# Patient Record
Sex: FEMALE | Race: WHITE | NOT HISPANIC OR LATINO
[De-identification: names, ages, dates, MRNs, and addresses within clinical notes are randomized per-mention and may not be internally consistent; named-entity substitution may affect disease eponyms.]

---

## 2021-06-29 ENCOUNTER — APPOINTMENT (OUTPATIENT)
Dept: OTOLARYNGOLOGY | Facility: CLINIC | Age: 76
End: 2021-06-29
Payer: MEDICARE

## 2021-06-29 VITALS — TEMPERATURE: 98.6 F | BODY MASS INDEX: 19.63 KG/M2 | HEIGHT: 64 IN | WEIGHT: 115 LBS

## 2021-06-29 DIAGNOSIS — Z80.6 FAMILY HISTORY OF LEUKEMIA: ICD-10-CM

## 2021-06-29 DIAGNOSIS — H93.299 OTHER ABNORMAL AUDITORY PERCEPTIONS, UNSPECIFIED EAR: ICD-10-CM

## 2021-06-29 DIAGNOSIS — Z78.9 OTHER SPECIFIED HEALTH STATUS: ICD-10-CM

## 2021-06-29 DIAGNOSIS — Z85.828 PERSONAL HISTORY OF OTHER MALIGNANT NEOPLASM OF SKIN: ICD-10-CM

## 2021-06-29 DIAGNOSIS — Z87.891 PERSONAL HISTORY OF NICOTINE DEPENDENCE: ICD-10-CM

## 2021-06-29 PROBLEM — Z00.00 ENCOUNTER FOR PREVENTIVE HEALTH EXAMINATION: Status: ACTIVE | Noted: 2021-06-29

## 2021-06-29 PROCEDURE — 92550 TYMPANOMETRY & REFLEX THRESH: CPT

## 2021-06-29 PROCEDURE — 92557 COMPREHENSIVE HEARING TEST: CPT

## 2021-06-29 PROCEDURE — 99203 OFFICE O/P NEW LOW 30 MIN: CPT | Mod: 25

## 2021-06-29 PROCEDURE — 92504 EAR MICROSCOPY EXAMINATION: CPT

## 2021-06-29 RX ORDER — OMEGA-3/DHA/EPA/FISH OIL 300-1000MG
CAPSULE ORAL
Refills: 0 | Status: ACTIVE | COMMUNITY

## 2021-06-29 RX ORDER — LYSINE 600 MG
TABLET ORAL
Refills: 0 | Status: ACTIVE | COMMUNITY

## 2021-06-29 RX ORDER — CHROMIUM 200 MCG
TABLET ORAL
Refills: 0 | Status: ACTIVE | COMMUNITY

## 2021-06-29 RX ORDER — GLUCOSAMINE HCL/CHONDROITIN SU 500-400 MG
CAPSULE ORAL
Refills: 0 | Status: ACTIVE | COMMUNITY

## 2021-06-29 NOTE — HISTORY OF PRESENT ILLNESS
[de-identified] : NATAN ROCHE is a 75 year patient Here for evaluation for otalgia. 3 days ago, she developed right ear pain. Today, she also had some left ear discomfort. She has no otorrhea, tinnitus, or dizziness. She does need to have dental work performed. She had a left lower molar extracted about 2 weeks ago. She also needs a Veneer replaced. She has no nasal or throat symptoms. She has no history of recurrent ear infections, prior otologic surgery, or excessive noise exposure. She has had some difficulty hearing which she noticed before the pandemic started. She has not had a recent hearing test. She is a former smoker who quit many years ago. She uses Q-tips occasionally. She does sleep with earplugs.

## 2021-06-29 NOTE — CONSULT LETTER
[Dear  ___] : Dear  [unfilled], [Consult Letter:] : I had the pleasure of evaluating your patient, [unfilled]. [Please see my note below.] : Please see my note below. [Consult Closing:] : Thank you very much for allowing me to participate in the care of this patient.  If you have any questions, please do not hesitate to contact me. [Sincerely,] : Sincerely, [FreeTextEntry3] : Johanna Best MD\par

## 2021-06-29 NOTE — ASSESSMENT
[FreeTextEntry1] : She has a three-day history of right otalgia. She also had some left ear discomfort today. Her ears were normal on exam except for a small bruise on the lateral anterior canal wall on the right side. There was no infection or otitis externa. I believe the ear pain is referred ear pain due to her recent dental work and possible TMJ dysfunction. The small bruise is unlikely to cause the pain.  She may have altered her bite while she is undergoing dental work. Audiogram did show bilateral sensorineural hearing loss.\par \par PLAN\par \par -findings and management options discussed in detail with the patient. \par -good aural hygiene\par -avoid using cotton swabs in the ears\par -wax removal drops as needed. \par -noise precautions\par -Recommend a repeat audiogram in one year\par -consider HAE\par - Literature regarding TMJD was given to the patient\par - I reviewed precautions for TMJ dysfunction.  If the pain persists, she may benefit from a nightguard and/or physical therapy\par -I asked her to follow up with her dentist.\par -I spoke with her about further workup if the pain persists. If it does not improve, I recommend flexible laryngoscopy and possible imaging studies. She deferred the procedure today.\par -I recommended a followup in 3 weeks to monitor her condition.\par -Call and return urgently if she has any worsening symptoms or concerns Received pt in semi-wallace position in bed, +IV, +bed alarm (3rd position); pt agrees to OT initially however deferring out of bed despite encouragement.

## 2021-07-29 ENCOUNTER — APPOINTMENT (OUTPATIENT)
Dept: OTOLARYNGOLOGY | Facility: CLINIC | Age: 76
End: 2021-07-29
Payer: MEDICARE

## 2021-07-29 VITALS — BODY MASS INDEX: 19.63 KG/M2 | WEIGHT: 115 LBS | TEMPERATURE: 97.6 F | HEIGHT: 64 IN

## 2021-07-29 DIAGNOSIS — H69.83 OTHER SPECIFIED DISORDERS OF EUSTACHIAN TUBE, BILATERAL: ICD-10-CM

## 2021-07-29 DIAGNOSIS — H92.03 OTALGIA, BILATERAL: ICD-10-CM

## 2021-07-29 DIAGNOSIS — K21.9 GASTRO-ESOPHAGEAL REFLUX DISEASE W/OUT ESOPHAGITIS: ICD-10-CM

## 2021-07-29 DIAGNOSIS — J31.0 CHRONIC RHINITIS: ICD-10-CM

## 2021-07-29 PROCEDURE — 31575 DIAGNOSTIC LARYNGOSCOPY: CPT

## 2021-07-29 PROCEDURE — 99213 OFFICE O/P EST LOW 20 MIN: CPT | Mod: 25

## 2021-07-29 NOTE — CONSULT LETTER
[Dear  ___] : Dear  [unfilled], [Courtesy Letter:] : I had the pleasure of seeing your patient, [unfilled], in my office today. [Please see my note below.] : Please see my note below. [Consult Closing:] : Thank you very much for allowing me to participate in the care of this patient.  If you have any questions, please do not hesitate to contact me. [Sincerely,] : Sincerely, [FreeTextEntry3] : Johanna Best MD\par

## 2021-07-29 NOTE — ASSESSMENT
[FreeTextEntry1] : The severe ear pain has improved. She has ear pressure with a sensation of eustachian tube dysfunction. Her ears were normal on exam. She said her dentist did not find TMJ dysfunction. She does have a history of chronic rhinitis and findings consistent with reflux. This could be causing the ear pressure. There were no suspicious masses or lesions on exam. We discussed possible etiologies of her symptoms. \par \par PLAN\par \par -findings and management options discussed in detail with the patient. \par -good aural hygiene\par -Recommend a repeat audiogram in one year\par -warm compresses/heating pad, soft diet and ibuprofen as needed. \par -reflux precautions. she was given literature \par -another trial of pepcid for 2-3 weeks\par -she may consider GI evaluation for reflux\par -nasal steroid spray and antihistamine as needed\par -allergy precautions\par -she could consider seeing an allergist. \par -consider CT scan if not improving\par -follow up in 3 weeks. \par -Call and return urgently if she has any worsening symptoms or concerns

## 2021-07-29 NOTE — HISTORY OF PRESENT ILLNESS
[de-identified] : NATAN ROCHE is a 75 year patient Here for followup for right otalgia. I saw her on June 29 for ear pain mostly on the right side.  She was undergoing dental treatment at the time. She said that she completed the dental treatment. She then had a muscle strain involving the gingiva on the right side. She was treated with antibiotics, anti-inflammatories and warm compresses. She said it improved. She said the dentist did not feel that she had TMJ dysfunction. The severe ear pain resolved. She does have ear popping and pressure mostly in the morning. She does have a history of eustachian tube dysfunction. She said that whenever she has an air conditioner on, she has less nasal congestion and sneezing. The ears also feel better. She occasionally has nausea.  She may have reflux. She tried taking Pepcid before dinner but did not do it for very long. She did not notice a change. Audiogram at her last visit showed bilateral SNHL with normal tympanograms

## 2023-06-06 ENCOUNTER — OUTPATIENT (OUTPATIENT)
Dept: OUTPATIENT SERVICES | Facility: HOSPITAL | Age: 78
LOS: 1 days | Discharge: HOME | End: 2023-06-06
Payer: MEDICARE

## 2023-06-06 ENCOUNTER — TRANSCRIPTION ENCOUNTER (OUTPATIENT)
Age: 78
End: 2023-06-06

## 2023-06-06 PROCEDURE — 88305 TISSUE EXAM BY PATHOLOGIST: CPT | Mod: 26

## 2023-06-06 PROCEDURE — 45380 COLONOSCOPY AND BIOPSY: CPT | Mod: PT,XS

## 2023-06-06 PROCEDURE — 88305 TISSUE EXAM BY PATHOLOGIST: CPT

## 2023-06-06 PROCEDURE — 45385 COLONOSCOPY W/LESION REMOVAL: CPT | Mod: PT

## 2023-06-06 NOTE — PRE-ANESTHESIA EVALUATION ADULT - NSANTHPMHFT_GEN_ALL_CORE
NATAN ROCHE is being seen for an initial evaluation for ear pain.          NATAN ROCHE is a 75 year patient Here for evaluation for otalgia. 3 days ago, she developed right ear pain. Today, she also had some left ear discomfort. She has no otorrhea, tinnitus, or dizziness. She does need to have dental work performed. She had a left lower molar extracted about 2 weeks ago. She also needs a Veneer replaced. She has no nasal or throat symptoms. She has no history of recurrent ear infections, prior otologic surgery, or excessive noise exposure. She has had some difficulty hearing which she noticed before the pandemic started. She has not had a recent hearing test. She is a former smoker who quit many years ago. She uses Q-tips occasionally. She does sleep with earplugs.      Active Problems    Otalgia of both ears (388.70) (H92.03)  Sensorineural hearing loss (SNHL) of both ears (389.18) (H90.3)    Abnormal auditory perception (388.40) (H93.299)    Past Medical History    History of basal cell carcinoma (BCC) (V10.83) (Z85.828)    Surgical History    History of Excision of basal cell carcinoma  History of Trigger finger repair

## 2023-06-07 ENCOUNTER — TRANSCRIPTION ENCOUNTER (OUTPATIENT)
Age: 78
End: 2023-06-07

## 2023-06-08 LAB — SURGICAL PATHOLOGY STUDY: SIGNIFICANT CHANGE UP

## 2023-11-13 ENCOUNTER — APPOINTMENT (OUTPATIENT)
Dept: OTOLARYNGOLOGY | Facility: CLINIC | Age: 78
End: 2023-11-13
Payer: MEDICARE

## 2023-11-13 DIAGNOSIS — H90.3 SENSORINEURAL HEARING LOSS, BILATERAL: ICD-10-CM

## 2023-11-13 PROCEDURE — 92567 TYMPANOMETRY: CPT

## 2023-11-13 PROCEDURE — 92557 COMPREHENSIVE HEARING TEST: CPT

## 2023-11-13 PROCEDURE — 99214 OFFICE O/P EST MOD 30 MIN: CPT

## 2023-11-22 ENCOUNTER — APPOINTMENT (OUTPATIENT)
Dept: OTOLARYNGOLOGY | Facility: CLINIC | Age: 78
End: 2023-11-22
Payer: SELF-PAY

## 2023-11-22 PROCEDURE — V5010 ASSESSMENT FOR HEARING AID: CPT

## 2023-12-13 ENCOUNTER — APPOINTMENT (OUTPATIENT)
Dept: OTOLARYNGOLOGY | Facility: CLINIC | Age: 78
End: 2023-12-13
Payer: SELF-PAY

## 2023-12-13 PROCEDURE — V5261F: CUSTOM

## 2023-12-27 ENCOUNTER — APPOINTMENT (OUTPATIENT)
Dept: OTOLARYNGOLOGY | Facility: CLINIC | Age: 78
End: 2023-12-27
Payer: SELF-PAY

## 2023-12-27 PROCEDURE — 92593: CPT | Mod: NC

## 2024-01-02 PROBLEM — H90.3 SENSORINEURAL HEARING LOSS (SNHL) OF BOTH EARS: Status: ACTIVE | Noted: 2021-06-29

## 2024-01-04 ENCOUNTER — APPOINTMENT (OUTPATIENT)
Dept: OTOLARYNGOLOGY | Facility: CLINIC | Age: 79
End: 2024-01-04
Payer: SELF-PAY

## 2024-01-04 PROCEDURE — 92593: CPT | Mod: NC

## 2024-01-24 ENCOUNTER — APPOINTMENT (OUTPATIENT)
Dept: OTOLARYNGOLOGY | Facility: CLINIC | Age: 79
End: 2024-01-24
Payer: SELF-PAY

## 2024-01-24 PROCEDURE — 92593: CPT | Mod: NC

## 2024-04-04 ENCOUNTER — APPOINTMENT (OUTPATIENT)
Dept: OTOLARYNGOLOGY | Facility: CLINIC | Age: 79
End: 2024-04-04
Payer: SELF-PAY

## 2024-04-04 PROCEDURE — 92593: CPT | Mod: NC

## 2024-05-02 ENCOUNTER — APPOINTMENT (OUTPATIENT)
Dept: PULMONOLOGY | Facility: CLINIC | Age: 79
End: 2024-05-02
Payer: MEDICARE

## 2024-05-02 VITALS
WEIGHT: 105 LBS | DIASTOLIC BLOOD PRESSURE: 76 MMHG | OXYGEN SATURATION: 98 % | TEMPERATURE: 98.2 F | BODY MASS INDEX: 18.61 KG/M2 | SYSTOLIC BLOOD PRESSURE: 120 MMHG | HEART RATE: 85 BPM | HEIGHT: 63 IN

## 2024-05-02 DIAGNOSIS — J43.2 CENTRILOBULAR EMPHYSEMA: ICD-10-CM

## 2024-05-02 PROCEDURE — 99204 OFFICE O/P NEW MOD 45 MIN: CPT

## 2024-05-02 RX ORDER — ASPIRIN 81 MG
81 TABLET, DELAYED RELEASE (ENTERIC COATED) ORAL
Refills: 0 | Status: ACTIVE | COMMUNITY

## 2024-05-02 RX ORDER — MIRTAZAPINE 7.5 MG/1
TABLET, FILM COATED ORAL
Refills: 0 | Status: ACTIVE | COMMUNITY

## 2024-05-02 RX ORDER — OLANZAPINE 20 MG/1
TABLET, FILM COATED ORAL
Refills: 0 | Status: ACTIVE | COMMUNITY

## 2024-05-02 RX ORDER — CLONAZEPAM 2 MG/1
TABLET ORAL
Refills: 0 | Status: ACTIVE | COMMUNITY

## 2024-05-02 RX ORDER — ROSUVASTATIN CALCIUM 5 MG/1
5 TABLET, FILM COATED ORAL
Refills: 0 | Status: ACTIVE | COMMUNITY

## 2024-05-02 NOTE — HISTORY OF PRESENT ILLNESS
[TextBox_4] : 05/02/2024: Asked to evaluate patient by Dr Rosario Cespedes ([facility]) for emphysema on a cardiac scan. This was all screening. She has no chest pain. She has minimal dyspnea after climbing 2 flights of stairs. No trouble walking or exercising. She smoked till about 20 years ago after smoking 16-40 and again 40-50s or so at about 1.5 ppd.

## 2024-05-02 NOTE — CONSULT LETTER
[DrDivya  ___] : Dr. TIAN [DrDivya ___] : Dr. TIAN [Dear  ___] : Dear  [unfilled], [Courtesy Letter:] : I had the pleasure of seeing your patient, [unfilled], in my office today. [Please see my note below.] : Please see my note below. [Consult Closing:] : Thank you very much for allowing me to participate in the care of this patient.  If you have any questions, please do not hesitate to contact me. [Sincerely,] : Sincerely, [FreeTextEntry2] : Rosario [address]pedes  [address] [place] [address]   [FreeTextEntry3] : uJdie Mcintyre MD, [facility]

## 2024-05-02 NOTE — ASSESSMENT
[FreeTextEntry1] : Data reviewed:  Echo 1/2024: mild valvular disease and no evidence PH  Cardiac CT 2/2024 personally reviewed : some mild centrilobular emphysema in the visualized lung  Impression: Mild emphysema in asymptomatic former smoker Not qualified for LDCT, quit 2004  Plan: Nothing needs to be done here. Explained to her that this has likely been present for years and would not be expected to worsen in the context of her having quit smoking 20 years ago. Reassurance, no need for follow up with me but welcome to return should any new symptoms develop.

## 2024-05-16 ENCOUNTER — APPOINTMENT (OUTPATIENT)
Dept: OTOLARYNGOLOGY | Facility: CLINIC | Age: 79
End: 2024-05-16
Payer: SELF-PAY

## 2024-05-16 PROCEDURE — 92593: CPT | Mod: NC

## 2024-06-06 ENCOUNTER — OUTPATIENT (OUTPATIENT)
Dept: OUTPATIENT SERVICES | Facility: HOSPITAL | Age: 79
LOS: 1 days | Discharge: HOME | End: 2024-06-06
Payer: MEDICARE

## 2024-06-06 ENCOUNTER — TRANSCRIPTION ENCOUNTER (OUTPATIENT)
Age: 79
End: 2024-06-06

## 2024-06-06 VITALS
TEMPERATURE: 99 F | SYSTOLIC BLOOD PRESSURE: 135 MMHG | WEIGHT: 104.94 LBS | HEART RATE: 79 BPM | RESPIRATION RATE: 16 BRPM | HEIGHT: 63 IN | OXYGEN SATURATION: 97 % | DIASTOLIC BLOOD PRESSURE: 80 MMHG

## 2024-06-06 PROCEDURE — 88305 TISSUE EXAM BY PATHOLOGIST: CPT | Mod: 26

## 2024-06-06 NOTE — PRE-ANESTHESIA EVALUATION ADULT - NSANTHHOMEMEDSFT_GEN_ALL_CORE
Aspirin 81 MG TABS  clonazePAM TABS  Glucosamine Sulfate TABS  Lysine TABS  Magnesium TABS  Mirtazapine TABS  OLANZapine TABS  Rosuvastatin Calcium 5 MG Oral Tablet  Vitamin D TABS

## 2024-06-06 NOTE — PRE-ANESTHESIA EVALUATION ADULT - NSANTHOSAYNRD_GEN_A_CORE
No. ADELINE screening performed.  STOP BANG Legend: 0-2 = LOW Risk; 3-4 = INTERMEDIATE Risk; 5-8 = HIGH Risk

## 2024-06-07 ENCOUNTER — INPATIENT (INPATIENT)
Facility: HOSPITAL | Age: 79
LOS: 13 days | Discharge: HOME | DRG: 885 | End: 2024-06-21
Attending: PSYCHIATRY & NEUROLOGY | Admitting: PSYCHIATRY & NEUROLOGY
Payer: MEDICARE

## 2024-06-07 VITALS
OXYGEN SATURATION: 95 % | DIASTOLIC BLOOD PRESSURE: 81 MMHG | WEIGHT: 95.02 LBS | RESPIRATION RATE: 18 BRPM | HEART RATE: 93 BPM | SYSTOLIC BLOOD PRESSURE: 138 MMHG | TEMPERATURE: 98 F | HEIGHT: 63 IN

## 2024-06-07 DIAGNOSIS — F45.1 UNDIFFERENTIATED SOMATOFORM DISORDER: ICD-10-CM

## 2024-06-07 DIAGNOSIS — F41.9 ANXIETY DISORDER, UNSPECIFIED: ICD-10-CM

## 2024-06-07 LAB
ALBUMIN SERPL ELPH-MCNC: 4.5 G/DL — SIGNIFICANT CHANGE UP (ref 3.3–5)
ALP SERPL-CCNC: 92 U/L — SIGNIFICANT CHANGE UP (ref 40–120)
ALT FLD-CCNC: 22 U/L — SIGNIFICANT CHANGE UP (ref 10–45)
AMPHET UR-MCNC: NEGATIVE — SIGNIFICANT CHANGE UP
ANION GAP SERPL CALC-SCNC: 14 MMOL/L — SIGNIFICANT CHANGE UP (ref 5–17)
APAP SERPL-MCNC: <5 UG/ML — LOW (ref 10–30)
APPEARANCE UR: CLEAR — SIGNIFICANT CHANGE UP
AST SERPL-CCNC: 24 U/L — SIGNIFICANT CHANGE UP (ref 10–40)
BARBITURATES UR SCN-MCNC: NEGATIVE — SIGNIFICANT CHANGE UP
BASOPHILS # BLD AUTO: 0.02 K/UL — SIGNIFICANT CHANGE UP (ref 0–0.2)
BASOPHILS NFR BLD AUTO: 0.3 % — SIGNIFICANT CHANGE UP (ref 0–2)
BENZODIAZ UR-MCNC: NEGATIVE — SIGNIFICANT CHANGE UP
BILIRUB DIRECT SERPL-MCNC: <0.2 MG/DL — SIGNIFICANT CHANGE UP (ref 0–0.3)
BILIRUB INDIRECT FLD-MCNC: SIGNIFICANT CHANGE UP (ref 0.2–1)
BILIRUB SERPL-MCNC: 0.4 MG/DL — SIGNIFICANT CHANGE UP (ref 0.2–1.2)
BILIRUB UR-MCNC: NEGATIVE — SIGNIFICANT CHANGE UP
BUN SERPL-MCNC: 8 MG/DL — SIGNIFICANT CHANGE UP (ref 7–23)
CALCIUM SERPL-MCNC: 9.4 MG/DL — SIGNIFICANT CHANGE UP (ref 8.4–10.5)
CHLORIDE SERPL-SCNC: 102 MMOL/L — SIGNIFICANT CHANGE UP (ref 96–108)
CO2 SERPL-SCNC: 22 MMOL/L — SIGNIFICANT CHANGE UP (ref 22–31)
COCAINE METAB.OTHER UR-MCNC: NEGATIVE — SIGNIFICANT CHANGE UP
COLOR SPEC: YELLOW — SIGNIFICANT CHANGE UP
CREAT SERPL-MCNC: 0.72 MG/DL — SIGNIFICANT CHANGE UP (ref 0.5–1.3)
DIFF PNL FLD: NEGATIVE — SIGNIFICANT CHANGE UP
EGFR: 86 ML/MIN/1.73M2 — SIGNIFICANT CHANGE UP
EOSINOPHIL # BLD AUTO: 0.01 K/UL — SIGNIFICANT CHANGE UP (ref 0–0.5)
EOSINOPHIL NFR BLD AUTO: 0.2 % — SIGNIFICANT CHANGE UP (ref 0–6)
ETHANOL SERPL-MCNC: <10 MG/DL — SIGNIFICANT CHANGE UP (ref 0–10)
FLUAV AG NPH QL: SIGNIFICANT CHANGE UP
FLUBV AG NPH QL: SIGNIFICANT CHANGE UP
GLUCOSE SERPL-MCNC: 102 MG/DL — HIGH (ref 70–99)
GLUCOSE UR QL: NEGATIVE MG/DL — SIGNIFICANT CHANGE UP
HCT VFR BLD CALC: 35.9 % — SIGNIFICANT CHANGE UP (ref 34.5–45)
HGB BLD-MCNC: 12.4 G/DL — SIGNIFICANT CHANGE UP (ref 11.5–15.5)
IMM GRANULOCYTES NFR BLD AUTO: 0.5 % — SIGNIFICANT CHANGE UP (ref 0–0.9)
KETONES UR-MCNC: ABNORMAL MG/DL
LEUKOCYTE ESTERASE UR-ACNC: NEGATIVE — SIGNIFICANT CHANGE UP
LIDOCAIN IGE QN: 37 U/L — SIGNIFICANT CHANGE UP (ref 7–60)
LYMPHOCYTES # BLD AUTO: 1.5 K/UL — SIGNIFICANT CHANGE UP (ref 1–3.3)
LYMPHOCYTES # BLD AUTO: 24.5 % — SIGNIFICANT CHANGE UP (ref 13–44)
MCHC RBC-ENTMCNC: 31.9 PG — SIGNIFICANT CHANGE UP (ref 27–34)
MCHC RBC-ENTMCNC: 34.5 GM/DL — SIGNIFICANT CHANGE UP (ref 32–36)
MCV RBC AUTO: 92.3 FL — SIGNIFICANT CHANGE UP (ref 80–100)
METHADONE UR-MCNC: NEGATIVE — SIGNIFICANT CHANGE UP
MONOCYTES # BLD AUTO: 0.47 K/UL — SIGNIFICANT CHANGE UP (ref 0–0.9)
MONOCYTES NFR BLD AUTO: 7.7 % — SIGNIFICANT CHANGE UP (ref 2–14)
NEUTROPHILS # BLD AUTO: 4.08 K/UL — SIGNIFICANT CHANGE UP (ref 1.8–7.4)
NEUTROPHILS NFR BLD AUTO: 66.8 % — SIGNIFICANT CHANGE UP (ref 43–77)
NITRITE UR-MCNC: NEGATIVE — SIGNIFICANT CHANGE UP
NRBC # BLD: 0 /100 WBCS — SIGNIFICANT CHANGE UP (ref 0–0)
OPIATES UR-MCNC: NEGATIVE — SIGNIFICANT CHANGE UP
PCP SPEC-MCNC: SIGNIFICANT CHANGE UP
PCP UR-MCNC: NEGATIVE — SIGNIFICANT CHANGE UP
PH UR: 7 — SIGNIFICANT CHANGE UP (ref 5–8)
PLATELET # BLD AUTO: 208 K/UL — SIGNIFICANT CHANGE UP (ref 150–400)
POTASSIUM SERPL-MCNC: 4.4 MMOL/L — SIGNIFICANT CHANGE UP (ref 3.5–5.3)
POTASSIUM SERPL-SCNC: 4.4 MMOL/L — SIGNIFICANT CHANGE UP (ref 3.5–5.3)
PROT SERPL-MCNC: 7.3 G/DL — SIGNIFICANT CHANGE UP (ref 6–8.3)
PROT UR-MCNC: NEGATIVE MG/DL — SIGNIFICANT CHANGE UP
RBC # BLD: 3.89 M/UL — SIGNIFICANT CHANGE UP (ref 3.8–5.2)
RBC # FLD: 12 % — SIGNIFICANT CHANGE UP (ref 10.3–14.5)
RSV RNA NPH QL NAA+NON-PROBE: SIGNIFICANT CHANGE UP
SALICYLATES SERPL-MCNC: <0.3 MG/DL — LOW (ref 2.8–20)
SARS-COV-2 RNA SPEC QL NAA+PROBE: SIGNIFICANT CHANGE UP
SODIUM SERPL-SCNC: 138 MMOL/L — SIGNIFICANT CHANGE UP (ref 135–145)
SP GR SPEC: 1.01 — SIGNIFICANT CHANGE UP (ref 1–1.03)
SURGICAL PATHOLOGY STUDY: SIGNIFICANT CHANGE UP
THC UR QL: NEGATIVE — SIGNIFICANT CHANGE UP
TROPONIN T, HIGH SENSITIVITY RESULT: 11 NG/L — SIGNIFICANT CHANGE UP (ref 0–51)
UROBILINOGEN FLD QL: 1 MG/DL — SIGNIFICANT CHANGE UP (ref 0.2–1)
WBC # BLD: 6.11 K/UL — SIGNIFICANT CHANGE UP (ref 3.8–10.5)
WBC # FLD AUTO: 6.11 K/UL — SIGNIFICANT CHANGE UP (ref 3.8–10.5)

## 2024-06-07 PROCEDURE — 71046 X-RAY EXAM CHEST 2 VIEWS: CPT | Mod: 26

## 2024-06-07 PROCEDURE — 93010 ELECTROCARDIOGRAM REPORT: CPT

## 2024-06-07 PROCEDURE — 99285 EMERGENCY DEPT VISIT HI MDM: CPT

## 2024-06-07 PROCEDURE — 90792 PSYCH DIAG EVAL W/MED SRVCS: CPT

## 2024-06-07 RX ORDER — FAMOTIDINE 40 MG
20 TABLET ORAL ONCE
Refills: 0 | Status: COMPLETED | OUTPATIENT
Start: 2024-06-07 | End: 2024-06-07

## 2024-06-07 RX ORDER — MAGNESIUM, ALUMINUM HYDROXIDE 400-400
30 TABLET,CHEWABLE ORAL ONCE
Refills: 0 | Status: COMPLETED | OUTPATIENT
Start: 2024-06-07 | End: 2024-06-07

## 2024-06-07 RX ORDER — SODIUM CHLORIDE 0.9 % (FLUSH) 0.9 %
1000 SYRINGE (ML) INJECTION ONCE
Refills: 0 | Status: COMPLETED | OUTPATIENT
Start: 2024-06-07 | End: 2024-06-07

## 2024-06-07 RX ORDER — OLANZAPINE 2.5 MG/1
2.5 TABLET, FILM COATED ORAL EVERY 8 HOURS
Refills: 0 | Status: DISCONTINUED | OUTPATIENT
Start: 2024-06-07 | End: 2024-06-21

## 2024-06-07 RX ORDER — DIPHENHYDRAMINE HCL 12.5MG/5ML
25 ELIXIR ORAL AT BEDTIME
Refills: 0 | Status: DISCONTINUED | OUTPATIENT
Start: 2024-06-07 | End: 2024-06-21

## 2024-06-07 RX ORDER — HYDROXYZINE PAMOATE 50 MG/1
25 CAPSULE ORAL EVERY 8 HOURS
Refills: 0 | Status: DISCONTINUED | OUTPATIENT
Start: 2024-06-07 | End: 2024-06-21

## 2024-06-07 RX ORDER — MAGNESIUM, ALUMINUM HYDROXIDE 400-400
30 TABLET,CHEWABLE ORAL EVERY 6 HOURS
Refills: 0 | Status: DISCONTINUED | OUTPATIENT
Start: 2024-06-07 | End: 2024-06-21

## 2024-06-07 RX ORDER — MIRTAZAPINE 15 MG/1
7.5 TABLET, FILM COATED ORAL AT BEDTIME
Refills: 0 | Status: DISCONTINUED | OUTPATIENT
Start: 2024-06-07 | End: 2024-06-11

## 2024-06-07 RX ORDER — OLANZAPINE 2.5 MG/1
2.5 TABLET, FILM COATED ORAL AT BEDTIME
Refills: 0 | Status: DISCONTINUED | OUTPATIENT
Start: 2024-06-07 | End: 2024-06-11

## 2024-06-07 RX ADMIN — Medication 30 MILLILITER(S): at 15:33

## 2024-06-07 RX ADMIN — OLANZAPINE 2.5 MILLIGRAM(S): 2.5 TABLET, FILM COATED ORAL at 21:51

## 2024-06-07 RX ADMIN — Medication 20 MILLIGRAM(S): at 15:33

## 2024-06-07 RX ADMIN — HYDROXYZINE PAMOATE 25 MILLIGRAM(S): 50 CAPSULE ORAL at 22:26

## 2024-06-07 RX ADMIN — MIRTAZAPINE 7.5 MILLIGRAM(S): 15 TABLET, FILM COATED ORAL at 21:51

## 2024-06-07 RX ADMIN — Medication 1000 MILLILITER(S): at 15:34

## 2024-06-07 RX ADMIN — Medication 25 MILLIGRAM(S): at 23:54

## 2024-06-08 LAB
A1C WITH ESTIMATED AVERAGE GLUCOSE RESULT: 5.4 % — SIGNIFICANT CHANGE UP (ref 4–5.6)
CHOLEST SERPL-MCNC: 126 MG/DL — SIGNIFICANT CHANGE UP
ESTIMATED AVERAGE GLUCOSE: 108 MG/DL — SIGNIFICANT CHANGE UP (ref 68–114)
HDLC SERPL-MCNC: 64 MG/DL — SIGNIFICANT CHANGE UP
LIPID PNL WITH DIRECT LDL SERPL: 49 MG/DL — SIGNIFICANT CHANGE UP
NON HDL CHOLESTEROL: 62 MG/DL — SIGNIFICANT CHANGE UP
TRIGL SERPL-MCNC: 60 MG/DL — SIGNIFICANT CHANGE UP
TSH SERPL-MCNC: 2.06 UIU/ML — SIGNIFICANT CHANGE UP (ref 0.27–4.2)

## 2024-06-08 RX ORDER — POLYETHYLENE GLYCOL 3350 1 G/G
17 POWDER ORAL EVERY 12 HOURS
Refills: 0 | Status: DISCONTINUED | OUTPATIENT
Start: 2024-06-08 | End: 2024-06-21

## 2024-06-08 RX ORDER — ONDANSETRON HYDROCHLORIDE 2 MG/ML
4 INJECTION INTRAMUSCULAR; INTRAVENOUS EVERY 8 HOURS
Refills: 0 | Status: DISCONTINUED | OUTPATIENT
Start: 2024-06-08 | End: 2024-06-21

## 2024-06-08 RX ORDER — CLONAZEPAM 2 MG/1
0.38 TABLET ORAL AT BEDTIME
Refills: 0 | Status: DISCONTINUED | OUTPATIENT
Start: 2024-06-08 | End: 2024-06-08

## 2024-06-08 RX ORDER — CLONAZEPAM 2 MG/1
0.5 TABLET ORAL AT BEDTIME
Refills: 0 | Status: DISCONTINUED | OUTPATIENT
Start: 2024-06-08 | End: 2024-06-11

## 2024-06-08 RX ADMIN — OLANZAPINE 2.5 MILLIGRAM(S): 2.5 TABLET, FILM COATED ORAL at 22:40

## 2024-06-08 RX ADMIN — CLONAZEPAM 0.5 MILLIGRAM(S): 2 TABLET ORAL at 22:41

## 2024-06-08 RX ADMIN — ONDANSETRON HYDROCHLORIDE 4 MILLIGRAM(S): 2 INJECTION INTRAMUSCULAR; INTRAVENOUS at 15:13

## 2024-06-08 RX ADMIN — MIRTAZAPINE 7.5 MILLIGRAM(S): 15 TABLET, FILM COATED ORAL at 22:40

## 2024-06-08 RX ADMIN — POLYETHYLENE GLYCOL 3350 17 GRAM(S): 1 POWDER ORAL at 18:37

## 2024-06-09 RX ADMIN — POLYETHYLENE GLYCOL 3350 17 GRAM(S): 1 POWDER ORAL at 18:07

## 2024-06-09 RX ADMIN — CLONAZEPAM 0.5 MILLIGRAM(S): 2 TABLET ORAL at 21:50

## 2024-06-09 RX ADMIN — OLANZAPINE 2.5 MILLIGRAM(S): 2.5 TABLET, FILM COATED ORAL at 21:51

## 2024-06-09 RX ADMIN — MIRTAZAPINE 7.5 MILLIGRAM(S): 15 TABLET, FILM COATED ORAL at 21:50

## 2024-06-10 RX ORDER — ESCITALOPRAM OXALATE 20 MG/1
5 TABLET, FILM COATED ORAL ONCE
Refills: 0 | Status: COMPLETED | OUTPATIENT
Start: 2024-06-10 | End: 2024-06-10

## 2024-06-10 RX ORDER — ESCITALOPRAM OXALATE 20 MG/1
5 TABLET, FILM COATED ORAL DAILY
Refills: 0 | Status: DISCONTINUED | OUTPATIENT
Start: 2024-06-11 | End: 2024-06-11

## 2024-06-10 RX ORDER — ESCITALOPRAM OXALATE 20 MG/1
5 TABLET, FILM COATED ORAL DAILY
Refills: 0 | Status: DISCONTINUED | OUTPATIENT
Start: 2024-06-10 | End: 2024-06-10

## 2024-06-10 RX ADMIN — POLYETHYLENE GLYCOL 3350 17 GRAM(S): 1 POWDER ORAL at 18:46

## 2024-06-10 RX ADMIN — CLONAZEPAM 0.5 MILLIGRAM(S): 2 TABLET ORAL at 20:59

## 2024-06-10 RX ADMIN — MIRTAZAPINE 7.5 MILLIGRAM(S): 15 TABLET, FILM COATED ORAL at 21:00

## 2024-06-10 RX ADMIN — OLANZAPINE 2.5 MILLIGRAM(S): 2.5 TABLET, FILM COATED ORAL at 21:00

## 2024-06-10 RX ADMIN — ESCITALOPRAM OXALATE 5 MILLIGRAM(S): 20 TABLET, FILM COATED ORAL at 12:27

## 2024-06-10 RX ADMIN — Medication 30 MILLILITER(S): at 16:37

## 2024-06-10 RX ADMIN — ONDANSETRON HYDROCHLORIDE 4 MILLIGRAM(S): 2 INJECTION INTRAMUSCULAR; INTRAVENOUS at 12:27

## 2024-06-11 PROCEDURE — 99232 SBSQ HOSP IP/OBS MODERATE 35: CPT

## 2024-06-11 RX ORDER — CLONAZEPAM 2 MG/1
0.5 TABLET ORAL AT BEDTIME
Refills: 0 | Status: DISCONTINUED | OUTPATIENT
Start: 2024-06-11 | End: 2024-06-18

## 2024-06-11 RX ORDER — ESCITALOPRAM OXALATE 20 MG/1
5 TABLET, FILM COATED ORAL AT BEDTIME
Refills: 0 | Status: DISCONTINUED | OUTPATIENT
Start: 2024-06-12 | End: 2024-06-13

## 2024-06-11 RX ORDER — MIRTAZAPINE 15 MG/1
7.5 TABLET, FILM COATED ORAL AT BEDTIME
Refills: 0 | Status: DISCONTINUED | OUTPATIENT
Start: 2024-06-11 | End: 2024-06-21

## 2024-06-11 RX ORDER — OLANZAPINE 2.5 MG/1
2.5 TABLET, FILM COATED ORAL AT BEDTIME
Refills: 0 | Status: DISCONTINUED | OUTPATIENT
Start: 2024-06-11 | End: 2024-06-21

## 2024-06-11 RX ADMIN — CLONAZEPAM 0.5 MILLIGRAM(S): 2 TABLET ORAL at 21:02

## 2024-06-11 RX ADMIN — ESCITALOPRAM OXALATE 5 MILLIGRAM(S): 20 TABLET, FILM COATED ORAL at 09:59

## 2024-06-11 RX ADMIN — MIRTAZAPINE 7.5 MILLIGRAM(S): 15 TABLET, FILM COATED ORAL at 21:02

## 2024-06-11 RX ADMIN — POLYETHYLENE GLYCOL 3350 17 GRAM(S): 1 POWDER ORAL at 18:18

## 2024-06-11 RX ADMIN — OLANZAPINE 2.5 MILLIGRAM(S): 2.5 TABLET, FILM COATED ORAL at 21:02

## 2024-06-11 RX ADMIN — ONDANSETRON HYDROCHLORIDE 4 MILLIGRAM(S): 2 INJECTION INTRAMUSCULAR; INTRAVENOUS at 09:59

## 2024-06-12 PROCEDURE — 99232 SBSQ HOSP IP/OBS MODERATE 35: CPT

## 2024-06-12 RX ADMIN — ESCITALOPRAM OXALATE 5 MILLIGRAM(S): 20 TABLET, FILM COATED ORAL at 21:14

## 2024-06-12 RX ADMIN — ONDANSETRON HYDROCHLORIDE 4 MILLIGRAM(S): 2 INJECTION INTRAMUSCULAR; INTRAVENOUS at 10:59

## 2024-06-12 RX ADMIN — Medication 30 MILLILITER(S): at 15:10

## 2024-06-12 RX ADMIN — MIRTAZAPINE 7.5 MILLIGRAM(S): 15 TABLET, FILM COATED ORAL at 21:14

## 2024-06-12 RX ADMIN — Medication 25 MILLIGRAM(S): at 21:14

## 2024-06-12 RX ADMIN — HYDROXYZINE PAMOATE 25 MILLIGRAM(S): 50 CAPSULE ORAL at 10:59

## 2024-06-12 RX ADMIN — POLYETHYLENE GLYCOL 3350 17 GRAM(S): 1 POWDER ORAL at 17:48

## 2024-06-12 RX ADMIN — CLONAZEPAM 0.5 MILLIGRAM(S): 2 TABLET ORAL at 21:14

## 2024-06-12 RX ADMIN — OLANZAPINE 2.5 MILLIGRAM(S): 2.5 TABLET, FILM COATED ORAL at 15:09

## 2024-06-12 RX ADMIN — OLANZAPINE 2.5 MILLIGRAM(S): 2.5 TABLET, FILM COATED ORAL at 21:14

## 2024-06-13 PROCEDURE — 99232 SBSQ HOSP IP/OBS MODERATE 35: CPT

## 2024-06-13 RX ORDER — ESCITALOPRAM OXALATE 20 MG/1
10 TABLET, FILM COATED ORAL
Refills: 0 | Status: DISCONTINUED | OUTPATIENT
Start: 2024-06-13 | End: 2024-06-21

## 2024-06-13 RX ADMIN — POLYETHYLENE GLYCOL 3350 17 GRAM(S): 1 POWDER ORAL at 19:20

## 2024-06-13 RX ADMIN — ESCITALOPRAM OXALATE 10 MILLIGRAM(S): 20 TABLET, FILM COATED ORAL at 21:11

## 2024-06-13 RX ADMIN — Medication 1 TABLET(S): at 21:11

## 2024-06-13 RX ADMIN — OLANZAPINE 2.5 MILLIGRAM(S): 2.5 TABLET, FILM COATED ORAL at 21:11

## 2024-06-13 RX ADMIN — MIRTAZAPINE 7.5 MILLIGRAM(S): 15 TABLET, FILM COATED ORAL at 21:12

## 2024-06-13 RX ADMIN — CLONAZEPAM 0.5 MILLIGRAM(S): 2 TABLET ORAL at 21:11

## 2024-06-14 PROCEDURE — 99232 SBSQ HOSP IP/OBS MODERATE 35: CPT

## 2024-06-14 RX ADMIN — MIRTAZAPINE 7.5 MILLIGRAM(S): 15 TABLET, FILM COATED ORAL at 21:09

## 2024-06-14 RX ADMIN — ESCITALOPRAM OXALATE 10 MILLIGRAM(S): 20 TABLET, FILM COATED ORAL at 21:09

## 2024-06-14 RX ADMIN — CLONAZEPAM 0.5 MILLIGRAM(S): 2 TABLET ORAL at 21:08

## 2024-06-14 RX ADMIN — OLANZAPINE 2.5 MILLIGRAM(S): 2.5 TABLET, FILM COATED ORAL at 21:08

## 2024-06-14 RX ADMIN — Medication 25 MILLIGRAM(S): at 01:04

## 2024-06-14 RX ADMIN — Medication 1 TABLET(S): at 21:09

## 2024-06-14 RX ADMIN — POLYETHYLENE GLYCOL 3350 17 GRAM(S): 1 POWDER ORAL at 21:22

## 2024-06-14 RX ADMIN — OLANZAPINE 2.5 MILLIGRAM(S): 2.5 TABLET, FILM COATED ORAL at 11:47

## 2024-06-15 RX ADMIN — CLONAZEPAM 0.5 MILLIGRAM(S): 2 TABLET ORAL at 21:28

## 2024-06-15 RX ADMIN — ESCITALOPRAM OXALATE 10 MILLIGRAM(S): 20 TABLET, FILM COATED ORAL at 21:28

## 2024-06-15 RX ADMIN — OLANZAPINE 2.5 MILLIGRAM(S): 2.5 TABLET, FILM COATED ORAL at 21:28

## 2024-06-15 RX ADMIN — Medication 1 TABLET(S): at 21:29

## 2024-06-15 RX ADMIN — POLYETHYLENE GLYCOL 3350 17 GRAM(S): 1 POWDER ORAL at 19:18

## 2024-06-15 RX ADMIN — Medication 25 MILLIGRAM(S): at 00:12

## 2024-06-15 RX ADMIN — MIRTAZAPINE 7.5 MILLIGRAM(S): 15 TABLET, FILM COATED ORAL at 21:29

## 2024-06-16 RX ORDER — FAMOTIDINE 40 MG
20 TABLET ORAL ONCE
Refills: 0 | Status: COMPLETED | OUTPATIENT
Start: 2024-06-16 | End: 2024-06-16

## 2024-06-16 RX ORDER — LORAZEPAM 0.5 MG
1 TABLET ORAL ONCE
Refills: 0 | Status: DISCONTINUED | OUTPATIENT
Start: 2024-06-16 | End: 2024-06-16

## 2024-06-16 RX ADMIN — POLYETHYLENE GLYCOL 3350 17 GRAM(S): 1 POWDER ORAL at 20:49

## 2024-06-16 RX ADMIN — MIRTAZAPINE 7.5 MILLIGRAM(S): 15 TABLET, FILM COATED ORAL at 20:49

## 2024-06-16 RX ADMIN — Medication 20 MILLIGRAM(S): at 18:21

## 2024-06-16 RX ADMIN — CLONAZEPAM 0.5 MILLIGRAM(S): 2 TABLET ORAL at 20:48

## 2024-06-16 RX ADMIN — ESCITALOPRAM OXALATE 10 MILLIGRAM(S): 20 TABLET, FILM COATED ORAL at 20:48

## 2024-06-16 RX ADMIN — OLANZAPINE 2.5 MILLIGRAM(S): 2.5 TABLET, FILM COATED ORAL at 20:48

## 2024-06-16 RX ADMIN — Medication 1 MILLIGRAM(S): at 11:00

## 2024-06-16 RX ADMIN — Medication 1 TABLET(S): at 20:48

## 2024-06-17 PROCEDURE — 99231 SBSQ HOSP IP/OBS SF/LOW 25: CPT

## 2024-06-17 RX ORDER — SIMETHICONE 40MG/0.6ML
80 SUSPENSION, DROPS(FINAL DOSAGE FORM)(ML) ORAL DAILY
Refills: 0 | Status: DISCONTINUED | OUTPATIENT
Start: 2024-06-17 | End: 2024-06-21

## 2024-06-17 RX ADMIN — MIRTAZAPINE 7.5 MILLIGRAM(S): 15 TABLET, FILM COATED ORAL at 21:27

## 2024-06-17 RX ADMIN — ESCITALOPRAM OXALATE 10 MILLIGRAM(S): 20 TABLET, FILM COATED ORAL at 21:28

## 2024-06-17 RX ADMIN — OLANZAPINE 2.5 MILLIGRAM(S): 2.5 TABLET, FILM COATED ORAL at 21:28

## 2024-06-17 RX ADMIN — Medication 1 TABLET(S): at 21:27

## 2024-06-17 RX ADMIN — CLONAZEPAM 0.5 MILLIGRAM(S): 2 TABLET ORAL at 21:27

## 2024-06-18 PROCEDURE — 99232 SBSQ HOSP IP/OBS MODERATE 35: CPT

## 2024-06-18 RX ORDER — CLONAZEPAM 2 MG/1
0.5 TABLET ORAL AT BEDTIME
Refills: 0 | Status: DISCONTINUED | OUTPATIENT
Start: 2024-06-18 | End: 2024-06-21

## 2024-06-18 RX ADMIN — OLANZAPINE 2.5 MILLIGRAM(S): 2.5 TABLET, FILM COATED ORAL at 20:55

## 2024-06-18 RX ADMIN — POLYETHYLENE GLYCOL 3350 17 GRAM(S): 1 POWDER ORAL at 23:30

## 2024-06-18 RX ADMIN — HYDROXYZINE PAMOATE 25 MILLIGRAM(S): 50 CAPSULE ORAL at 01:31

## 2024-06-18 RX ADMIN — CLONAZEPAM 0.5 MILLIGRAM(S): 2 TABLET ORAL at 20:55

## 2024-06-18 RX ADMIN — ESCITALOPRAM OXALATE 10 MILLIGRAM(S): 20 TABLET, FILM COATED ORAL at 20:55

## 2024-06-18 RX ADMIN — POLYETHYLENE GLYCOL 3350 17 GRAM(S): 1 POWDER ORAL at 10:18

## 2024-06-18 RX ADMIN — Medication 25 MILLIGRAM(S): at 23:30

## 2024-06-18 RX ADMIN — ONDANSETRON HYDROCHLORIDE 4 MILLIGRAM(S): 2 INJECTION INTRAMUSCULAR; INTRAVENOUS at 13:35

## 2024-06-18 RX ADMIN — MIRTAZAPINE 7.5 MILLIGRAM(S): 15 TABLET, FILM COATED ORAL at 20:55

## 2024-06-18 RX ADMIN — Medication 25 MILLIGRAM(S): at 00:51

## 2024-06-18 RX ADMIN — HYDROXYZINE PAMOATE 25 MILLIGRAM(S): 50 CAPSULE ORAL at 10:16

## 2024-06-18 RX ADMIN — Medication 1 TABLET(S): at 20:55

## 2024-06-19 RX ADMIN — OLANZAPINE 2.5 MILLIGRAM(S): 2.5 TABLET, FILM COATED ORAL at 21:28

## 2024-06-19 RX ADMIN — Medication 25 MILLIGRAM(S): at 22:04

## 2024-06-19 RX ADMIN — MIRTAZAPINE 7.5 MILLIGRAM(S): 15 TABLET, FILM COATED ORAL at 21:28

## 2024-06-19 RX ADMIN — POLYETHYLENE GLYCOL 3350 17 GRAM(S): 1 POWDER ORAL at 21:28

## 2024-06-19 RX ADMIN — CLONAZEPAM 0.5 MILLIGRAM(S): 2 TABLET ORAL at 21:28

## 2024-06-19 RX ADMIN — ESCITALOPRAM OXALATE 10 MILLIGRAM(S): 20 TABLET, FILM COATED ORAL at 21:28

## 2024-06-19 RX ADMIN — Medication 1 TABLET(S): at 21:27

## 2024-06-20 PROCEDURE — 88305 TISSUE EXAM BY PATHOLOGIST: CPT

## 2024-06-20 PROCEDURE — 80307 DRUG TEST PRSMV CHEM ANLYZR: CPT

## 2024-06-20 PROCEDURE — 87637 SARSCOV2&INF A&B&RSV AMP PRB: CPT

## 2024-06-20 PROCEDURE — 99232 SBSQ HOSP IP/OBS MODERATE 35: CPT

## 2024-06-20 PROCEDURE — 84484 ASSAY OF TROPONIN QUANT: CPT

## 2024-06-20 PROCEDURE — 80061 LIPID PANEL: CPT

## 2024-06-20 PROCEDURE — 96374 THER/PROPH/DIAG INJ IV PUSH: CPT

## 2024-06-20 PROCEDURE — 84443 ASSAY THYROID STIM HORMONE: CPT

## 2024-06-20 PROCEDURE — 80048 BASIC METABOLIC PNL TOTAL CA: CPT

## 2024-06-20 PROCEDURE — 80076 HEPATIC FUNCTION PANEL: CPT

## 2024-06-20 PROCEDURE — 93005 ELECTROCARDIOGRAM TRACING: CPT

## 2024-06-20 PROCEDURE — 71046 X-RAY EXAM CHEST 2 VIEWS: CPT

## 2024-06-20 PROCEDURE — 83036 HEMOGLOBIN GLYCOSYLATED A1C: CPT

## 2024-06-20 PROCEDURE — 81003 URINALYSIS AUTO W/O SCOPE: CPT

## 2024-06-20 PROCEDURE — 99285 EMERGENCY DEPT VISIT HI MDM: CPT | Mod: 25

## 2024-06-20 PROCEDURE — 85025 COMPLETE CBC W/AUTO DIFF WBC: CPT

## 2024-06-20 PROCEDURE — 36415 COLL VENOUS BLD VENIPUNCTURE: CPT

## 2024-06-20 PROCEDURE — 83690 ASSAY OF LIPASE: CPT

## 2024-06-20 RX ORDER — ZOLPIDEM TARTRATE 10 MG
0 TABLET ORAL
Refills: 0 | DISCHARGE

## 2024-06-20 RX ORDER — POLYETHYLENE GLYCOL 3350 1 G/G
17 POWDER ORAL
Qty: 0 | Refills: 0 | DISCHARGE
Start: 2024-06-20

## 2024-06-20 RX ORDER — HYDROXYZINE PAMOATE 50 MG/1
1 CAPSULE ORAL
Qty: 0 | Refills: 0 | DISCHARGE
Start: 2024-06-20

## 2024-06-20 RX ORDER — CLONAZEPAM 2 MG/1
1 TABLET ORAL
Refills: 0 | DISCHARGE

## 2024-06-20 RX ORDER — OLANZAPINE 2.5 MG/1
1 TABLET, FILM COATED ORAL
Qty: 0 | Refills: 0 | DISCHARGE
Start: 2024-06-20

## 2024-06-20 RX ORDER — MIRTAZAPINE 15 MG/1
1 TABLET, FILM COATED ORAL
Qty: 0 | Refills: 0 | DISCHARGE
Start: 2024-06-20

## 2024-06-20 RX ORDER — CLONAZEPAM 2 MG/1
1 TABLET ORAL
Qty: 0 | Refills: 0 | DISCHARGE
Start: 2024-06-20

## 2024-06-20 RX ORDER — OLANZAPINE 2.5 MG/1
1 TABLET, FILM COATED ORAL
Refills: 0 | DISCHARGE

## 2024-06-20 RX ORDER — MIRTAZAPINE 15 MG/1
2 TABLET, FILM COATED ORAL
Refills: 0 | DISCHARGE

## 2024-06-20 RX ORDER — ESCITALOPRAM OXALATE 20 MG/1
1 TABLET, FILM COATED ORAL
Qty: 0 | Refills: 0 | DISCHARGE
Start: 2024-06-20

## 2024-06-20 RX ADMIN — HYDROXYZINE PAMOATE 25 MILLIGRAM(S): 50 CAPSULE ORAL at 02:29

## 2024-06-20 RX ADMIN — MIRTAZAPINE 7.5 MILLIGRAM(S): 15 TABLET, FILM COATED ORAL at 21:17

## 2024-06-20 RX ADMIN — POLYETHYLENE GLYCOL 3350 17 GRAM(S): 1 POWDER ORAL at 21:18

## 2024-06-20 RX ADMIN — OLANZAPINE 2.5 MILLIGRAM(S): 2.5 TABLET, FILM COATED ORAL at 21:18

## 2024-06-20 RX ADMIN — Medication 25 MILLIGRAM(S): at 21:18

## 2024-06-20 RX ADMIN — CLONAZEPAM 0.5 MILLIGRAM(S): 2 TABLET ORAL at 21:17

## 2024-06-20 RX ADMIN — Medication 1 TABLET(S): at 21:17

## 2024-06-20 RX ADMIN — ESCITALOPRAM OXALATE 10 MILLIGRAM(S): 20 TABLET, FILM COATED ORAL at 21:17

## 2024-06-21 VITALS
HEART RATE: 91 BPM | TEMPERATURE: 99 F | DIASTOLIC BLOOD PRESSURE: 84 MMHG | SYSTOLIC BLOOD PRESSURE: 135 MMHG | OXYGEN SATURATION: 94 %

## 2024-06-21 PROCEDURE — 99238 HOSP IP/OBS DSCHRG MGMT 30/<: CPT

## 2024-06-21 RX ORDER — ESCITALOPRAM OXALATE 20 MG/1
1 TABLET, FILM COATED ORAL
Qty: 14 | Refills: 0
Start: 2024-06-21 | End: 2024-07-04

## 2024-06-21 RX ORDER — HYDROXYZINE PAMOATE 50 MG/1
1 CAPSULE ORAL
Qty: 42 | Refills: 0
Start: 2024-06-21 | End: 2024-07-04

## 2024-06-21 RX ORDER — CLONAZEPAM 2 MG/1
1 TABLET ORAL
Qty: 14 | Refills: 0
Start: 2024-06-21 | End: 2024-07-04

## 2024-06-21 RX ORDER — POLYETHYLENE GLYCOL 3350 1 G/G
17 POWDER ORAL
Qty: 1020 | Refills: 0
Start: 2024-06-21 | End: 2024-07-20

## 2024-06-21 RX ORDER — OLANZAPINE 2.5 MG/1
1 TABLET, FILM COATED ORAL
Qty: 14 | Refills: 0
Start: 2024-06-21 | End: 2024-07-04

## 2024-06-21 RX ORDER — MIRTAZAPINE 15 MG/1
1 TABLET, FILM COATED ORAL
Qty: 14 | Refills: 0
Start: 2024-06-21 | End: 2024-07-04

## 2024-06-26 DIAGNOSIS — R53.83 OTHER FATIGUE: ICD-10-CM

## 2024-06-26 DIAGNOSIS — G47.00 INSOMNIA, UNSPECIFIED: ICD-10-CM

## 2024-06-26 DIAGNOSIS — T43.225A ADVERSE EFFECT OF SELECTIVE SEROTONIN REUPTAKE INHIBITORS, INITIAL ENCOUNTER: ICD-10-CM

## 2024-06-26 DIAGNOSIS — F32.9 MAJOR DEPRESSIVE DISORDER, SINGLE EPISODE, UNSPECIFIED: ICD-10-CM

## 2024-06-26 DIAGNOSIS — Z87.891 PERSONAL HISTORY OF NICOTINE DEPENDENCE: ICD-10-CM

## 2024-06-26 DIAGNOSIS — Z88.2 ALLERGY STATUS TO SULFONAMIDES: ICD-10-CM

## 2024-06-26 DIAGNOSIS — R11.0 NAUSEA: ICD-10-CM

## 2024-06-26 DIAGNOSIS — R41.3 OTHER AMNESIA: ICD-10-CM

## 2024-06-26 DIAGNOSIS — R45.851 SUICIDAL IDEATIONS: ICD-10-CM

## 2024-06-26 DIAGNOSIS — E43 UNSPECIFIED SEVERE PROTEIN-CALORIE MALNUTRITION: ICD-10-CM

## 2024-06-26 DIAGNOSIS — Z79.899 OTHER LONG TERM (CURRENT) DRUG THERAPY: ICD-10-CM

## 2024-06-26 DIAGNOSIS — F33.9 MAJOR DEPRESSIVE DISORDER, RECURRENT, UNSPECIFIED: ICD-10-CM

## 2024-06-26 DIAGNOSIS — K59.00 CONSTIPATION, UNSPECIFIED: ICD-10-CM

## 2024-08-09 ENCOUNTER — APPOINTMENT (OUTPATIENT)
Dept: OTOLARYNGOLOGY | Facility: CLINIC | Age: 79
End: 2024-08-09

## 2024-08-09 PROCEDURE — 92593: CPT | Mod: NC

## 2024-11-07 ENCOUNTER — APPOINTMENT (OUTPATIENT)
Dept: OTOLARYNGOLOGY | Facility: CLINIC | Age: 79
End: 2024-11-07

## 2024-11-21 ENCOUNTER — APPOINTMENT (OUTPATIENT)
Dept: OTOLARYNGOLOGY | Facility: CLINIC | Age: 79
End: 2024-11-21
Payer: MEDICARE

## 2024-11-21 ENCOUNTER — APPOINTMENT (OUTPATIENT)
Dept: OTOLARYNGOLOGY | Facility: CLINIC | Age: 79
End: 2024-11-21
Payer: SELF-PAY

## 2024-11-21 DIAGNOSIS — H90.3 SENSORINEURAL HEARING LOSS, BILATERAL: ICD-10-CM

## 2024-11-21 PROCEDURE — 92567 TYMPANOMETRY: CPT

## 2024-11-21 PROCEDURE — 99213 OFFICE O/P EST LOW 20 MIN: CPT

## 2024-11-21 PROCEDURE — 92593: CPT | Mod: NC

## 2024-11-21 PROCEDURE — 92557 COMPREHENSIVE HEARING TEST: CPT

## 2024-11-21 RX ORDER — ESCITALOPRAM OXALATE 5 MG/1
TABLET, FILM COATED ORAL
Refills: 0 | Status: ACTIVE | COMMUNITY

## 2024-12-12 ENCOUNTER — APPOINTMENT (OUTPATIENT)
Dept: OTOLARYNGOLOGY | Facility: CLINIC | Age: 79
End: 2024-12-12
Payer: SELF-PAY

## 2024-12-12 PROCEDURE — 92593: CPT | Mod: NC

## 2025-02-26 ENCOUNTER — APPOINTMENT (OUTPATIENT)
Dept: OTOLARYNGOLOGY | Facility: CLINIC | Age: 80
End: 2025-02-26
Payer: SELF-PAY

## 2025-02-26 PROCEDURE — 92593: CPT | Mod: NC

## 2025-04-08 ENCOUNTER — APPOINTMENT (OUTPATIENT)
Dept: OTOLARYNGOLOGY | Facility: CLINIC | Age: 80
End: 2025-04-08
Payer: SELF-PAY

## 2025-04-08 PROCEDURE — 92593: CPT | Mod: NC

## 2025-06-11 ENCOUNTER — APPOINTMENT (OUTPATIENT)
Dept: OTOLARYNGOLOGY | Facility: CLINIC | Age: 80
End: 2025-06-11

## 2025-06-11 PROCEDURE — 92593: CPT | Mod: NC

## (undated) DEVICE — SNARE CAPTIVATOR COLD RND STIFF 10X2.4X2.8MM 240CM

## (undated) DEVICE — FORCEP RADIAL JAW 4 240CM DISP